# Patient Record
Sex: FEMALE | Race: WHITE | URBAN - METROPOLITAN AREA
[De-identification: names, ages, dates, MRNs, and addresses within clinical notes are randomized per-mention and may not be internally consistent; named-entity substitution may affect disease eponyms.]

---

## 2018-12-17 ENCOUNTER — APPOINTMENT (RX ONLY)
Dept: URBAN - METROPOLITAN AREA CLINIC 16 | Facility: CLINIC | Age: 18
Setting detail: DERMATOLOGY
End: 2018-12-17

## 2018-12-17 DIAGNOSIS — L30.9 DERMATITIS, UNSPECIFIED: ICD-10-CM

## 2018-12-17 PROBLEM — F32.9 MAJOR DEPRESSIVE DISORDER, SINGLE EPISODE, UNSPECIFIED: Status: ACTIVE | Noted: 2018-12-17

## 2018-12-17 PROBLEM — F41.9 ANXIETY DISORDER, UNSPECIFIED: Status: ACTIVE | Noted: 2018-12-17

## 2018-12-17 PROCEDURE — ? BIOPSY BY PUNCH METHOD

## 2018-12-17 PROCEDURE — 11100: CPT

## 2018-12-17 PROCEDURE — ? COUNSELING

## 2018-12-17 PROCEDURE — ? DIAGNOSIS COMMENT

## 2018-12-17 ASSESSMENT — LOCATION ZONE DERM
LOCATION ZONE: LEG
LOCATION ZONE: ARM
LOCATION ZONE: FEET

## 2018-12-17 ASSESSMENT — LOCATION DETAILED DESCRIPTION DERM
LOCATION DETAILED: LEFT DORSAL FOOT
LOCATION DETAILED: LEFT ACHILLES SKIN
LOCATION DETAILED: RIGHT ANKLE
LOCATION DETAILED: RIGHT ELBOW

## 2018-12-17 ASSESSMENT — LOCATION SIMPLE DESCRIPTION DERM
LOCATION SIMPLE: LEFT FOOT
LOCATION SIMPLE: RIGHT ELBOW
LOCATION SIMPLE: RIGHT ANKLE
LOCATION SIMPLE: LEFT ACHILLES SKIN

## 2018-12-17 NOTE — PROCEDURE: DIAGNOSIS COMMENT
Comment: Several month history of asymptomatic eruption. 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 Punch biopsy from right elbow today. Treatment plan pending biopsy results.
Detail Level: Simple

## 2018-12-27 ENCOUNTER — APPOINTMENT (RX ONLY)
Dept: URBAN - METROPOLITAN AREA CLINIC 16 | Facility: CLINIC | Age: 18
Setting detail: DERMATOLOGY
End: 2018-12-27

## 2018-12-27 DIAGNOSIS — Z48.02 ENCOUNTER FOR REMOVAL OF SUTURES: ICD-10-CM

## 2018-12-27 DIAGNOSIS — L92.0 GRANULOMA ANNULARE: ICD-10-CM

## 2018-12-27 PROCEDURE — ? INJECTION

## 2018-12-27 PROCEDURE — ? SUTURE REMOVAL (NO GLOBAL PERIOD)

## 2018-12-27 PROCEDURE — ? COUNSELING

## 2018-12-27 PROCEDURE — ? ORDER TESTS

## 2018-12-27 PROCEDURE — 11900 INJECT SKIN LESIONS </W 7: CPT

## 2018-12-27 PROCEDURE — ? PRESCRIPTION

## 2018-12-27 PROCEDURE — ? DIAGNOSIS COMMENT

## 2018-12-27 RX ORDER — TRIAMCINOLONE ACETONIDE 1 MG/G
CREAM TOPICAL BID
Qty: 1 | Refills: 0 | Status: ERX | COMMUNITY
Start: 2018-12-27

## 2018-12-27 RX ADMIN — TRIAMCINOLONE ACETONIDE: 1 CREAM TOPICAL at 16:38

## 2018-12-27 ASSESSMENT — LOCATION SIMPLE DESCRIPTION DERM
LOCATION SIMPLE: RIGHT ACHILLES SKIN
LOCATION SIMPLE: LEFT FOOT
LOCATION SIMPLE: RIGHT ELBOW
LOCATION SIMPLE: LEFT ACHILLES SKIN

## 2018-12-27 ASSESSMENT — LOCATION ZONE DERM
LOCATION ZONE: FEET
LOCATION ZONE: ARM
LOCATION ZONE: LEG

## 2018-12-27 ASSESSMENT — LOCATION DETAILED DESCRIPTION DERM
LOCATION DETAILED: RIGHT ACHILLES SKIN
LOCATION DETAILED: LEFT ACHILLES SKIN
LOCATION DETAILED: RIGHT ELBOW
LOCATION DETAILED: LEFT DORSAL FOOT

## 2018-12-27 NOTE — PROCEDURE: DIAGNOSIS COMMENT
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 Patient unsure if there is a family history of diabetes mellitus, thyroid disease, or hyperlipidemia - plan to check labs to see if patient has any of these potential underlying disorders. Given localized locations to primarily acral surfaces and lack of risk factors/negative ROS, no further work-up otherwise needed (eg hepatitis). \\n\\nTreatment options reviewed in detail today. Patient prefers IL TAC to left dorsal foot due to discomfort, otherwise will start topical therapy with triamcinolone cream to the other areas. 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 Patient unsure if there is a family history of diabetes mellitus, thyroid disease, or hyperlipidemia - plan to check labs to see if patient has any of these potential underlying disorders. Given localized locations to primarily acral surfaces and lack of risk factors/negative ROS, no further work-up otherwise needed (eg hepatitis). \\n\\nTreatment options reviewed in detail today. Patient prefers IL TAC to left dorsal foot due to discomfort, otherwise will start topical therapy with triamcinolone cream to the other areas.

## 2019-01-23 ENCOUNTER — APPOINTMENT (RX ONLY)
Dept: URBAN - METROPOLITAN AREA CLINIC 16 | Facility: CLINIC | Age: 19
Setting detail: DERMATOLOGY
End: 2019-01-23

## 2019-01-23 DIAGNOSIS — L81.0 POSTINFLAMMATORY HYPERPIGMENTATION: ICD-10-CM

## 2019-01-23 DIAGNOSIS — L92.0 GRANULOMA ANNULARE: ICD-10-CM

## 2019-01-23 PROCEDURE — ? TREATMENT REGIMEN

## 2019-01-23 PROCEDURE — ? COUNSELING

## 2019-01-23 PROCEDURE — 99213 OFFICE O/P EST LOW 20 MIN: CPT

## 2019-01-23 PROCEDURE — ? DIAGNOSIS COMMENT

## 2019-01-23 ASSESSMENT — LOCATION SIMPLE DESCRIPTION DERM
LOCATION SIMPLE: LEFT FOOT
LOCATION SIMPLE: LEFT ACHILLES SKIN
LOCATION SIMPLE: RIGHT ELBOW
LOCATION SIMPLE: RIGHT ACHILLES SKIN

## 2019-01-23 ASSESSMENT — LOCATION DETAILED DESCRIPTION DERM
LOCATION DETAILED: LEFT ACHILLES SKIN
LOCATION DETAILED: RIGHT ELBOW
LOCATION DETAILED: RIGHT ACHILLES SKIN
LOCATION DETAILED: LEFT LATERAL DORSAL FOOT
LOCATION DETAILED: LEFT DORSAL FOOT

## 2019-01-23 ASSESSMENT — LOCATION ZONE DERM
LOCATION ZONE: ARM
LOCATION ZONE: LEG
LOCATION ZONE: FEET

## 2019-01-23 NOTE — PROCEDURE: DIAGNOSIS COMMENT
Comment: Biopsy-proven under [id number]; annular papular GA on Achilles, resolved from right elbow and dorsum of foot. Patient unsure if there is a family history of diabetes mellitus, thyroid disease, or hyperlipidemia - TSH slightly elevated but free T4 WNL, HgA1C and lipid panel within normal limits. Given localized locations to primarily acral surfaces and lack of risk factors/negative ROS, no further work-up otherwise needed (eg hepatitis). \\n\\Aileen last visit, IL TAC 5 mg/ml injected to left dorsal foot due to discomfort with resolution. Only today with PIH to the left foot. Only activity on exam seen on left achilles. Continue TAC to left Achilles until resolved. F/u in 3 months, or earlier if new lesions arise and patient desires ILK. Comment: Biopsy-proven under [id number]; annular papular GA on Achilles, resolved from right elbow and dorsum of foot. Patient unsure if there is a family history of diabetes mellitus, thyroid disease, or hyperlipidemia - TSH slightly elevated but free T4 WNL, HgA1C and lipid panel within normal limits. Given localized locations to primarily acral surfaces and lack of risk factors/negative ROS, no further work-up otherwise needed (eg hepatitis). \\n\\Aileen last visit, IL TAC 5 mg/ml injected to left dorsal foot due to discomfort with resolution. Only today with PIH to the left foot. Only activity on exam seen on left achilles. Continue TAC to left Achilles until resolved. F/u in 3 months, or earlier if new lesions arise and patient desires ILK.

## 2019-01-23 NOTE — PROCEDURE: TREATMENT REGIMEN
Detail Level: Zone
Continue Regimen: Triamcinolone 0.1% cream BID to active papules M-F, discontinue when lesions resolve.

## 2019-04-22 ENCOUNTER — APPOINTMENT (RX ONLY)
Dept: URBAN - METROPOLITAN AREA CLINIC 16 | Facility: CLINIC | Age: 19
Setting detail: DERMATOLOGY
End: 2019-04-22

## 2019-04-22 DIAGNOSIS — L74.51 PRIMARY FOCAL HYPERHIDROSIS: ICD-10-CM

## 2019-04-22 DIAGNOSIS — L81.0 POSTINFLAMMATORY HYPERPIGMENTATION: ICD-10-CM

## 2019-04-22 DIAGNOSIS — L92.0 GRANULOMA ANNULARE: ICD-10-CM | Status: RESOLVED

## 2019-04-22 PROBLEM — L74.510 PRIMARY FOCAL HYPERHIDROSIS, AXILLA: Status: ACTIVE | Noted: 2019-04-22

## 2019-04-22 PROCEDURE — 99213 OFFICE O/P EST LOW 20 MIN: CPT

## 2019-04-22 PROCEDURE — ? DIAGNOSIS COMMENT

## 2019-04-22 PROCEDURE — ? PRESCRIPTION

## 2019-04-22 PROCEDURE — ? COUNSELING

## 2019-04-22 PROCEDURE — ? TREATMENT REGIMEN

## 2019-04-22 RX ORDER — GLYCOPYRRONIUM 2.4 G/100G
CLOTH TOPICAL
Qty: 1 | Refills: 2 | Status: ERX | COMMUNITY
Start: 2019-04-22

## 2019-04-22 RX ADMIN — GLYCOPYRRONIUM: 2.4 CLOTH TOPICAL at 17:08

## 2019-04-22 ASSESSMENT — LOCATION ZONE DERM
LOCATION ZONE: AXILLAE
LOCATION ZONE: ARM
LOCATION ZONE: LEG
LOCATION ZONE: FEET

## 2019-04-22 ASSESSMENT — LOCATION DETAILED DESCRIPTION DERM
LOCATION DETAILED: RIGHT ANTERIOR AXILLA
LOCATION DETAILED: LEFT DORSAL FOOT
LOCATION DETAILED: RIGHT ELBOW
LOCATION DETAILED: LEFT ACHILLES SKIN
LOCATION DETAILED: LEFT LATERAL DORSAL FOOT
LOCATION DETAILED: RIGHT ACHILLES SKIN
LOCATION DETAILED: LEFT ANTERIOR AXILLA

## 2019-04-22 ASSESSMENT — LOCATION SIMPLE DESCRIPTION DERM
LOCATION SIMPLE: RIGHT AXILLA
LOCATION SIMPLE: RIGHT ACHILLES SKIN
LOCATION SIMPLE: LEFT AXILLA
LOCATION SIMPLE: LEFT ACHILLES SKIN
LOCATION SIMPLE: LEFT FOOT
LOCATION SIMPLE: RIGHT ELBOW

## 2019-04-22 NOTE — PROCEDURE: DIAGNOSIS COMMENT
Comment: Resolved on exam today. Biopsy-proven under [id number]; annular papular GA resolved from right elbow, left achilles, dorsum of foot after treatment with IL TAC once to left dorsal foot and topical triamcinolone. \\n\\nPrior lab work-up: TSH slightly elevated but free T4 WNL, HgA1C and lipid panel within normal limits. Given localized locations to primarily acral surfaces and lack of risk factors/negative ROS, no further work-up otherwise needed (eg hepatitis). Comment: Resolved on exam today. Biopsy-proven under [id number]; annular papular GA resolved from right elbow, left achilles, dorsum of foot after treatment with IL TAC once to left dorsal foot and topical triamcinolone. \\n\\nPrior lab work-up: TSH slightly elevated but free T4 WNL, HgA1C and lipid panel within normal limits. Given localized locations to primarily acral surfaces and lack of risk factors/negative ROS, no further work-up otherwise needed (eg hepatitis).

## 2019-04-22 NOTE — PROCEDURE: DIAGNOSIS COMMENT
Comment: Primary focal hyperhidrosis of bilateral axillae. No signs/symptoms concerning for secondary hyperhidrosis. Treatment options reviewed in detail today including topical aluminum chloride, topical Qbrexza wipes, oral glycopyrrolate, oral oxybutinin, botulinum toxin injections. Patient prefers to start with topical therapy. Given easily irritated and sensitive skin, plan to start with Qbrexza (glycopyrronium) wipes once daily.

## 2019-04-22 NOTE — PROCEDURE: COUNSELING
Detail Level: Simple
Medical Necessity Clause: Botulinum toxin hyperhidrosis therapy is medically necessary because
Detail Level: Zone
Medical Necessity Information: LCD Guidelines vary from payer to payer. Please check with your payer's policy to determine medical necessity.

## 2019-04-22 NOTE — PROCEDURE: TREATMENT REGIMEN
Detail Level: Simple
Initiate Treatment: Start Qbrexza wipes to axillae once daily. Reviewed importance of washing hands afterwards- avoid contact with eyes or mouth

## 2019-06-19 ENCOUNTER — APPOINTMENT (RX ONLY)
Dept: URBAN - METROPOLITAN AREA CLINIC 16 | Facility: CLINIC | Age: 19
Setting detail: DERMATOLOGY
End: 2019-06-19

## 2019-06-19 DIAGNOSIS — L74.51 PRIMARY FOCAL HYPERHIDROSIS: ICD-10-CM

## 2019-06-19 PROBLEM — L74.510 PRIMARY FOCAL HYPERHIDROSIS, AXILLA: Status: ACTIVE | Noted: 2019-06-19

## 2019-06-19 PROCEDURE — ? TREATMENT REGIMEN

## 2019-06-19 PROCEDURE — ? COUNSELING

## 2019-06-19 PROCEDURE — 99213 OFFICE O/P EST LOW 20 MIN: CPT

## 2019-06-19 PROCEDURE — ? DIAGNOSIS COMMENT

## 2019-06-19 ASSESSMENT — LOCATION ZONE DERM: LOCATION ZONE: AXILLAE

## 2019-06-19 ASSESSMENT — LOCATION DETAILED DESCRIPTION DERM
LOCATION DETAILED: RIGHT ANTERIOR AXILLA
LOCATION DETAILED: LEFT ANTERIOR AXILLA

## 2019-06-19 ASSESSMENT — LOCATION SIMPLE DESCRIPTION DERM
LOCATION SIMPLE: LEFT AXILLA
LOCATION SIMPLE: RIGHT AXILLA

## 2019-06-19 NOTE — PROCEDURE: DIAGNOSIS COMMENT
Comment: Primary focal hyperhidrosis of bilateral axillae much improved from prior visit after starting topical aluminum chloride (Qbrexza wipes were not covered by patient’s insurance). Although patient is much improved and tolerating aluminum chloride well, she reports that she is still interested in botulinum toxin injections in the future - she will call her insurance company with the CPT codes to check for coverage and call the office back if she wants to move forward with botulinum toxin treatments.
Detail Level: Simple
